# Patient Record
Sex: FEMALE
[De-identification: names, ages, dates, MRNs, and addresses within clinical notes are randomized per-mention and may not be internally consistent; named-entity substitution may affect disease eponyms.]

---

## 2022-07-29 ENCOUNTER — APPOINTMENT (OUTPATIENT)
Dept: ORTHOPEDIC SURGERY | Facility: CLINIC | Age: 32
End: 2022-07-29

## 2022-07-29 VITALS — BODY MASS INDEX: 36.09 KG/M2 | WEIGHT: 238.1 LBS | HEIGHT: 68.11 IN

## 2022-07-29 DIAGNOSIS — G56.01 CARPAL TUNNEL SYNDROME, RIGHT UPPER LIMB: ICD-10-CM

## 2022-07-29 DIAGNOSIS — Z86.2 PERSONAL HISTORY OF DISEASES OF THE BLOOD AND BLOOD-FORMING ORGANS AND CERTAIN DISORDERS INVOLVING THE IMMUNE MECHANISM: ICD-10-CM

## 2022-07-29 DIAGNOSIS — Z78.9 OTHER SPECIFIED HEALTH STATUS: ICD-10-CM

## 2022-07-29 PROBLEM — Z00.00 ENCOUNTER FOR PREVENTIVE HEALTH EXAMINATION: Status: ACTIVE | Noted: 2022-07-29

## 2022-07-29 PROCEDURE — 99213 OFFICE O/P EST LOW 20 MIN: CPT | Mod: 25

## 2022-07-29 PROCEDURE — 73110 X-RAY EXAM OF WRIST: CPT | Mod: RT

## 2022-07-29 PROCEDURE — 73130 X-RAY EXAM OF HAND: CPT | Mod: RT

## 2022-07-29 RX ORDER — NAPROXEN 500 MG/1
500 TABLET ORAL
Qty: 60 | Refills: 0 | Status: ACTIVE | COMMUNITY
Start: 2022-07-29 | End: 1900-01-01

## 2022-07-29 RX ORDER — GABAPENTIN 300 MG/1
300 CAPSULE ORAL
Qty: 30 | Refills: 0 | Status: ACTIVE | COMMUNITY
Start: 2022-07-29 | End: 1900-01-01

## 2022-07-29 NOTE — DISCUSSION/SUMMARY
[de-identified] :   Naproxen 500 mg and gabapentin 300 mg was sent to the patient's pharmacy to help alleviate her symptoms.  The patient was advised to rest/ice the area and can alternate with warm compresses as needed.  \par \par Gentle ROM exercises in Epsom salt and warm water was encouraged.  She was placed in a new cock-up wrist brace for support/stability.  Advised patient to wear this brace overnight to help with the numbness.\par \par EMG/NCS was ordered for further evaluation.  She will be on light duty at work until her next evaluation.  Patient will follow-up in 3-4 weeks for further evaluation.  All of the patient's questions/concerns were answered in detail.  \par \par The patient was seen under the supervision of Dr. Rai.\par

## 2022-07-29 NOTE — IMAGING
[de-identified] :   Examination of the right hand is as follows:  No swelling, erythema or ecchymosis noted.  Tenderness to palpation over dorsal/volar wrist.  Full range of motion of wrist without pain.  No tenderness to palpation of her hand.  Mild decreased sensation to touch over median nerve distribution.  Positive Phalen's and positive Tinel's testing.  Grasp strength 5/5.\par \par X-rays taken of the patient's right hand/wrist in the office today revealed no obvious fractures, subluxations, or dislocations.  No significant abnormalities are seen.

## 2022-07-29 NOTE — HISTORY OF PRESENT ILLNESS
[de-identified] :  Patient is a 31-year-old female who reports the office for evaluation of her right hand/wrist pain that has been aggravating her for the past few weeks.  Denies any trauma or injury to the area.  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  She admits to numbness and tingling of her first 3 fingers often.  She has taken meloxicam and has been wearing a cock-up wrist brace which has given her some relief.

## 2022-08-08 ENCOUNTER — APPOINTMENT (OUTPATIENT)
Dept: NEUROLOGY | Facility: CLINIC | Age: 32
End: 2022-08-08

## 2022-08-19 ENCOUNTER — APPOINTMENT (OUTPATIENT)
Dept: ORTHOPEDIC SURGERY | Facility: CLINIC | Age: 32
End: 2022-08-19